# Patient Record
Sex: MALE | Race: WHITE | NOT HISPANIC OR LATINO | Employment: FULL TIME | URBAN - METROPOLITAN AREA
[De-identification: names, ages, dates, MRNs, and addresses within clinical notes are randomized per-mention and may not be internally consistent; named-entity substitution may affect disease eponyms.]

---

## 2024-09-09 ENCOUNTER — OFFICE VISIT (OUTPATIENT)
Dept: INTERNAL MEDICINE | Age: 34
End: 2024-09-09
Payer: COMMERCIAL

## 2024-09-09 VITALS
WEIGHT: 202.8 LBS | HEART RATE: 80 BPM | DIASTOLIC BLOOD PRESSURE: 82 MMHG | RESPIRATION RATE: 22 BRPM | BODY MASS INDEX: 32.59 KG/M2 | HEIGHT: 66 IN | SYSTOLIC BLOOD PRESSURE: 130 MMHG | OXYGEN SATURATION: 98 % | TEMPERATURE: 98.6 F

## 2024-09-09 DIAGNOSIS — E55.9 VITAMIN D DEFICIENCY: ICD-10-CM

## 2024-09-09 DIAGNOSIS — Z13.6 ENCOUNTER FOR SCREENING FOR CARDIOVASCULAR DISORDERS: ICD-10-CM

## 2024-09-09 DIAGNOSIS — J30.1 SEASONAL ALLERGIC RHINITIS DUE TO POLLEN: ICD-10-CM

## 2024-09-09 DIAGNOSIS — A68.9 FEVER, RECURRENT: Primary | ICD-10-CM

## 2024-09-09 DIAGNOSIS — Z11.59 NEED FOR HEPATITIS C SCREENING TEST: ICD-10-CM

## 2024-09-09 DIAGNOSIS — H65.01 RIGHT ACUTE SEROUS OTITIS MEDIA, RECURRENCE NOT SPECIFIED: ICD-10-CM

## 2024-09-09 LAB
25(OH)D3 SERPL-MCNC: 62 NG/ML (ref 30–100)
ALBUMIN SERPL-MCNC: 4.5 G/DL (ref 3.5–5.2)
ALBUMIN/GLOB SERPL: 1.2 G/DL
ALP SERPL-CCNC: 76 U/L (ref 39–117)
ALT SERPL W P-5'-P-CCNC: 30 U/L (ref 1–41)
ANION GAP SERPL CALCULATED.3IONS-SCNC: 11.6 MMOL/L (ref 5–15)
AST SERPL-CCNC: 21 U/L (ref 1–40)
BASOPHILS # BLD AUTO: 0.08 10*3/MM3 (ref 0–0.2)
BASOPHILS NFR BLD AUTO: 1.4 % (ref 0–1.5)
BILIRUB SERPL-MCNC: 0.6 MG/DL (ref 0–1.2)
BUN SERPL-MCNC: 9 MG/DL (ref 6–20)
BUN/CREAT SERPL: 10.8 (ref 7–25)
CALCIUM SPEC-SCNC: 10.5 MG/DL (ref 8.6–10.5)
CHLORIDE SERPL-SCNC: 102 MMOL/L (ref 98–107)
CHOLEST SERPL-MCNC: 162 MG/DL (ref 0–200)
CO2 SERPL-SCNC: 26.4 MMOL/L (ref 22–29)
CREAT SERPL-MCNC: 0.83 MG/DL (ref 0.76–1.27)
DEPRECATED RDW RBC AUTO: 39.1 FL (ref 37–54)
EGFRCR SERPLBLD CKD-EPI 2021: 119.3 ML/MIN/1.73
EOSINOPHIL # BLD AUTO: 0.09 10*3/MM3 (ref 0–0.4)
EOSINOPHIL NFR BLD AUTO: 1.6 % (ref 0.3–6.2)
ERYTHROCYTE [DISTWIDTH] IN BLOOD BY AUTOMATED COUNT: 12.2 % (ref 12.3–15.4)
ERYTHROCYTE [SEDIMENTATION RATE] IN BLOOD: 38 MM/HR (ref 0–15)
FERRITIN SERPL-MCNC: 581 NG/ML (ref 30–400)
FOLATE SERPL-MCNC: 15.6 NG/ML (ref 4.78–24.2)
GLOBULIN UR ELPH-MCNC: 3.7 GM/DL
GLUCOSE SERPL-MCNC: 94 MG/DL (ref 65–99)
HCT VFR BLD AUTO: 44.6 % (ref 37.5–51)
HCV AB SER QL: NORMAL
HDLC SERPL-MCNC: 29 MG/DL (ref 40–60)
HGB BLD-MCNC: 15 G/DL (ref 13–17.7)
HIV 1+2 AB+HIV1 P24 AG SERPL QL IA: NORMAL
IMM GRANULOCYTES # BLD AUTO: 0.07 10*3/MM3 (ref 0–0.05)
IMM GRANULOCYTES NFR BLD AUTO: 1.2 % (ref 0–0.5)
LDLC SERPL CALC-MCNC: 111 MG/DL (ref 0–100)
LDLC/HDLC SERPL: 3.77 {RATIO}
LYMPHOCYTES # BLD AUTO: 2.06 10*3/MM3 (ref 0.7–3.1)
LYMPHOCYTES NFR BLD AUTO: 36.3 % (ref 19.6–45.3)
MAGNESIUM SERPL-MCNC: 2.3 MG/DL (ref 1.6–2.6)
MCH RBC QN AUTO: 29.9 PG (ref 26.6–33)
MCHC RBC AUTO-ENTMCNC: 33.6 G/DL (ref 31.5–35.7)
MCV RBC AUTO: 88.8 FL (ref 79–97)
MONOCYTES # BLD AUTO: 0.43 10*3/MM3 (ref 0.1–0.9)
MONOCYTES NFR BLD AUTO: 7.6 % (ref 5–12)
NEUTROPHILS NFR BLD AUTO: 2.95 10*3/MM3 (ref 1.7–7)
NEUTROPHILS NFR BLD AUTO: 51.9 % (ref 42.7–76)
NRBC BLD AUTO-RTO: 0 /100 WBC (ref 0–0.2)
PLATELET # BLD AUTO: 356 10*3/MM3 (ref 140–450)
PMV BLD AUTO: 10.1 FL (ref 6–12)
POTASSIUM SERPL-SCNC: 4.6 MMOL/L (ref 3.5–5.2)
PROT SERPL-MCNC: 8.2 G/DL (ref 6–8.5)
RBC # BLD AUTO: 5.02 10*6/MM3 (ref 4.14–5.8)
SODIUM SERPL-SCNC: 140 MMOL/L (ref 136–145)
T3FREE SERPL-MCNC: 3.7 PG/ML (ref 2–4.4)
T4 FREE SERPL-MCNC: 1.25 NG/DL (ref 0.92–1.68)
TRIGL SERPL-MCNC: 119 MG/DL (ref 0–150)
TSH SERPL DL<=0.05 MIU/L-ACNC: 0.61 UIU/ML (ref 0.27–4.2)
VIT B12 BLD-MCNC: 683 PG/ML (ref 211–946)
VLDLC SERPL-MCNC: 22 MG/DL (ref 5–40)
WBC NRBC COR # BLD AUTO: 5.68 10*3/MM3 (ref 3.4–10.8)

## 2024-09-09 PROCEDURE — 86665 EPSTEIN-BARR CAPSID VCA: CPT | Performed by: INTERNAL MEDICINE

## 2024-09-09 PROCEDURE — 82306 VITAMIN D 25 HYDROXY: CPT | Performed by: INTERNAL MEDICINE

## 2024-09-09 PROCEDURE — 99213 OFFICE O/P EST LOW 20 MIN: CPT | Performed by: INTERNAL MEDICINE

## 2024-09-09 PROCEDURE — 80061 LIPID PANEL: CPT | Performed by: INTERNAL MEDICINE

## 2024-09-09 PROCEDURE — 84439 ASSAY OF FREE THYROXINE: CPT | Performed by: INTERNAL MEDICINE

## 2024-09-09 PROCEDURE — 85652 RBC SED RATE AUTOMATED: CPT | Performed by: INTERNAL MEDICINE

## 2024-09-09 PROCEDURE — G0432 EIA HIV-1/HIV-2 SCREEN: HCPCS | Performed by: INTERNAL MEDICINE

## 2024-09-09 PROCEDURE — 83735 ASSAY OF MAGNESIUM: CPT | Performed by: INTERNAL MEDICINE

## 2024-09-09 PROCEDURE — 36415 COLL VENOUS BLD VENIPUNCTURE: CPT | Performed by: INTERNAL MEDICINE

## 2024-09-09 PROCEDURE — 84443 ASSAY THYROID STIM HORMONE: CPT | Performed by: INTERNAL MEDICINE

## 2024-09-09 PROCEDURE — 82746 ASSAY OF FOLIC ACID SERUM: CPT | Performed by: INTERNAL MEDICINE

## 2024-09-09 PROCEDURE — 86803 HEPATITIS C AB TEST: CPT | Performed by: INTERNAL MEDICINE

## 2024-09-09 PROCEDURE — 82728 ASSAY OF FERRITIN: CPT | Performed by: INTERNAL MEDICINE

## 2024-09-09 PROCEDURE — 84481 FREE ASSAY (FT-3): CPT | Performed by: INTERNAL MEDICINE

## 2024-09-09 PROCEDURE — 85025 COMPLETE CBC W/AUTO DIFF WBC: CPT | Performed by: INTERNAL MEDICINE

## 2024-09-09 PROCEDURE — 82607 VITAMIN B-12: CPT | Performed by: INTERNAL MEDICINE

## 2024-09-09 PROCEDURE — 80053 COMPREHEN METABOLIC PANEL: CPT | Performed by: INTERNAL MEDICINE

## 2024-09-09 RX ORDER — MONTELUKAST SODIUM 10 MG/1
10 TABLET ORAL NIGHTLY
Qty: 30 TABLET | Refills: 2 | Status: SHIPPED | OUTPATIENT
Start: 2024-09-09

## 2024-09-09 RX ORDER — CETIRIZINE HYDROCHLORIDE 10 MG/1
10 TABLET ORAL DAILY
Qty: 30 TABLET | Refills: 2 | Status: SHIPPED | OUTPATIENT
Start: 2024-09-09

## 2024-09-09 RX ORDER — GUAIFENESIN 600 MG/1
1200 TABLET, EXTENDED RELEASE ORAL 2 TIMES DAILY
Qty: 30 TABLET | Refills: 0 | Status: SHIPPED | OUTPATIENT
Start: 2024-09-09

## 2024-09-09 RX ORDER — AZITHROMYCIN 250 MG/1
TABLET, FILM COATED ORAL
Qty: 6 TABLET | Refills: 0 | Status: SHIPPED | OUTPATIENT
Start: 2024-09-09

## 2024-09-09 NOTE — PROGRESS NOTES
CHIEF COMPLAINT  Brian Goodman presents to [facility] to Fever (States he has had a fever that has been persistent for 11 days. States he is also having neck stiffness, has been tested for covid and flu a few days ago and he was negative. The urgent care started him on antibiotics. Went to [facility] and they ruled out meningitis. ) and Establish Care (33 year old male here today to establish care with a new provider. )     HPI  33-year-old patient here to establish care.  Main concern is states he has had a fever for the last 11 days-PCP was >10 yrs ago Cheri      He was seen at [facility] ER 9/6/2024 -had a chest x-ray which was negative for any acute disease-no neck pain at that time negative for strep/flu a flu B- diagnosis was viral illness and low suspicion for any meningitis as he was 8 days out at that time from his symptoms-temp at that time was 99  Patient initially seen August 30, 2024 at urgent clinic-and had 1 week of upper respiratory infection symptoms with temp up to 102 at that time-temp was 99-diagnosis pharyngitis and was given Keflex 5 mg twice a day for 10 days  -He was negative for COVID flu AB and strep at that time  S/p amoxicillin in 8/2024 for ear infection    Now with NO neck stiffness, temp 101 last night and this am 100.7- took ibuprofen last night-200 mg-2 tabs- and tylenol 500 mg 2x/d past 9-10 days  Records reviewed, meds reviewed in detail, labs reviewed with patient.  Plan of care is as follows below.    [id number]-[id number]-BL- no meds usually  ROS-fever, congestion, cough, PND, green nasal d/c, no rash, no arthralgias    SH-works at [employer]-from home, has 2 and 4 yo-goes to day care/-not sick at present-wife is a schoolteacher- Dany curtis, no cigs , ETOH or drugs      Past History:  Allergies: Patient has no known allergies.   Medical History: has a past medical history of Heart abnormality.   Surgical History: has  "a past surgical history that includes [place] tooth extraction.   Family History: family history is not on file.   Social History: reports that he has never smoked. He has never used smokeless tobacco. He reports that he does not currently use alcohol. He reports that he does not use drugs.  Social History     Social History Narrative    Not on file         Current Outpatient Medications:     azithromycin (Zithromax Z-Amado) 250 MG tablet, Take 2 tablets by mouth on day 1, then 1 tablet daily on days 2-5, Disp: 6 tablet, Rfl: 0    cephalexin (KEFLEX) 500 MG capsule, Take 1 capsule by mouth 2 (Two) Times a Day for 10 days. (Patient not taking: Reported on 9/9/2024), Disp: 20 capsule, Rfl: 0    cetirizine (zyrTEC) 10 MG tablet, Take 1 tablet by mouth Daily., Disp: 30 tablet, Rfl: 2    guaiFENesin (Mucinex) 600 MG 12 hr tablet, Take 2 tablets by mouth 2 (Two) Times a Day., Disp: 30 tablet, Rfl: 0    montelukast (Singulair) 10 MG tablet, Take 1 tablet by mouth Every Night., Disp: 30 tablet, Rfl: 2     OBJECTIVE  Vital Signs  Vitals:    09/09/24 0928 09/09/24 0956 09/09/24 1005   BP: 148/92 130/82    BP Location: Left arm Left arm    Patient Position: Sitting Sitting    Cuff Size:  Adult    Pulse: 102  80   Resp: 22     Temp: 98.6 °F (37 °C)     TempSrc: Temporal     SpO2: 98%     Weight: 92 kg (202 lb 12.8 oz)     Height: 168.9 cm (66.5\")        Body mass index is 32.25 kg/m².      Physical Exam  Vitals and nursing note reviewed.   Constitutional:       Appearance: Normal appearance.   HENT:      Head: Normocephalic and atraumatic.      Left Ear: Tympanic membrane normal.      Ears:      Comments: Dull TM -     Nose: Nose normal. Rhinorrhea present.      Mouth/Throat:      Pharynx: No oropharyngeal exudate or posterior oropharyngeal erythema.   Eyes:      Extraocular Movements: Extraocular movements intact.      Pupils: Pupils are equal, round, and reactive to light.   Cardiovascular:      Rate and Rhythm: Normal rate and " "regular rhythm.   Pulmonary:      Effort: Pulmonary effort is normal.      Breath sounds: Normal breath sounds.   Abdominal:      General: Abdomen is flat.      Palpations: Abdomen is soft.   Musculoskeletal:      Cervical back: Normal range of motion and neck supple.   Skin:     General: Skin is warm and dry.   Neurological:      General: No focal deficit present.      Mental Status: He is alert and oriented to person, place, and time.   Psychiatric:         Mood and Affect: Mood normal.         Behavior: Behavior normal.         RESULTS REVIEW  No results found for: \"PROBNP\", \"BNP\"          No results found for: \"TSH\"   No results found for: \"FREET4\"         No Images in the past 120 days found..              ASSESSMENT & PLAN  Diagnoses and all orders for this visit:    1. Fever, recurrent (Primary)  Comments:  For past 17 days status post Keflex-last dose today and prior to that amox x2 wks negative chest x-ray 3 days ago do labs today-for FUO even though < 3 weeks  Orders:  -     Comprehensive Metabolic Panel; Future  -     Lipid Panel; Future  -     TSH+Free T4; Future  -     T3, Free; Future  -     Vitamin B12; Future  -     Folate; Future  -     Magnesium; Future  -     Vitamin D,25-Hydroxy; Future  -     CBC & Differential; Future  -     MicroAlbumin, Urine, Random - Urine, Clean Catch; Future  -     Hepatitis C Antibody; Future  -     azithromycin (Zithromax Z-Amado) 250 MG tablet; Take 2 tablets by mouth on day 1, then 1 tablet daily on days 2-5  Dispense: 6 tablet; Refill: 0  -     guaiFENesin (Mucinex) 600 MG 12 hr tablet; Take 2 tablets by mouth 2 (Two) Times a Day.  Dispense: 30 tablet; Refill: 0  -     Urinalysis With Culture If Indicated -; Future  -     HIV-1 / O / 2 Ag / Antibody; Future  -     Sedimentation Rate; Future  -     EBVCA(IgG / M); Future  -     Ferritin; Future    2. Right acute serous otitis media, recurrence not specified  Comments:  Mucinex twice a day as needed for " congestion  Orders:  -     azithromycin (Zithromax Z-Amado) 250 MG tablet; Take 2 tablets by mouth on day 1, then 1 tablet daily on days 2-5  Dispense: 6 tablet; Refill: 0  -     guaiFENesin (Mucinex) 600 MG 12 hr tablet; Take 2 tablets by mouth 2 (Two) Times a Day.  Dispense: 30 tablet; Refill: 0  -     montelukast (Singulair) 10 MG tablet; Take 1 tablet by mouth Every Night.  Dispense: 30 tablet; Refill: 2    3. Seasonal allergic rhinitis due to pollen  Comments:  Start Singulair 10 mg daily Zyrtec 10 mg daily and Mucinex twice a day as needed  Orders:  -     Comprehensive Metabolic Panel; Future  -     Lipid Panel; Future  -     TSH+Free T4; Future  -     T3, Free; Future  -     Vitamin B12; Future  -     Folate; Future  -     Magnesium; Future  -     Vitamin D,25-Hydroxy; Future  -     CBC & Differential; Future  -     MicroAlbumin, Urine, Random - Urine, Clean Catch; Future  -     Hepatitis C Antibody; Future  -     montelukast (Singulair) 10 MG tablet; Take 1 tablet by mouth Every Night.  Dispense: 30 tablet; Refill: 2  -     cetirizine (zyrTEC) 10 MG tablet; Take 1 tablet by mouth Daily.  Dispense: 30 tablet; Refill: 2  -     Urinalysis With Culture If Indicated -; Future  -     HIV-1 / O / 2 Ag / Antibody; Future  -     Sedimentation Rate; Future  -     EBVCA(IgG / M); Future    4. Need for hepatitis C screening test  -     Comprehensive Metabolic Panel; Future  -     Lipid Panel; Future  -     TSH+Free T4; Future  -     T3, Free; Future  -     Vitamin B12; Future  -     Folate; Future  -     Magnesium; Future  -     Vitamin D,25-Hydroxy; Future  -     CBC & Differential; Future  -     MicroAlbumin, Urine, Random - Urine, Clean Catch; Future  -     Hepatitis C Antibody; Future    5. Encounter for screening for cardiovascular disorders  -     Comprehensive Metabolic Panel; Future  -     Lipid Panel; Future  -     TSH+Free T4; Future  -     T3, Free; Future  -     Vitamin B12; Future  -     Folate; Future  -      Magnesium; Future  -     Vitamin D,25-Hydroxy; Future  -     CBC & Differential; Future  -     MicroAlbumin, Urine, Random - Urine, Clean Catch; Future  -     Hepatitis C Antibody; Future    6. Vitamin D deficiency  -     Comprehensive Metabolic Panel; Future  -     Lipid Panel; Future  -     TSH+Free T4; Future  -     T3, Free; Future  -     Vitamin B12; Future  -     Folate; Future  -     Magnesium; Future  -     Vitamin D,25-Hydroxy; Future  -     CBC & Differential; Future  -     MicroAlbumin, Urine, Random - Urine, Clean Catch; Future  -     Hepatitis C Antibody; Future         BMI is >= 30 and <35. (Class 1 Obesity). The following options were offered after discussion;: weight loss educational material (shared in after visit summary), exercise counseling/recommendations, and nutrition counseling/recommendations     Plan-9/9/2024  Patient Instructions   Patient with fever of 17 days etiology unclear we will order lab work to look for fever unknown origin even though technically > 3 weeks is definition of FUO  Check EMILY HIV sed rate EBV urinalysis along with routine labs today  Treat with Z-Amado for broader coverage and use Mucinex twice a day as needed for right serous otitis  Try montelukast/Singulair 10 mg once daily for postnasal drainage  Try Zyrtec 10 mg once daily  Follow-up in 10 to 14 days for reevaluation      Addendum 9/10/2024 WBC normal increased immature granulocytes 1 and 1% and sed rate elevated patient was given a Z-Amado results consistent with possible bacterial infection being treated now with more broader antibiotic will continue to monitor rest of labs unremarkable  FOLLOW UP  Return in about 10 days (around 9/19/2024) for Recheck, Annual physical.  Review labs    Patient was given instructions and counseling regarding his condition or for health maintenance advice. Please see specific information pulled into the AVS if appropriate.

## 2024-09-09 NOTE — PATIENT INSTRUCTIONS
Patient with fever of 17 days etiology unclear we will order lab work to look for fever unknown origin even though technically > 3 weeks is definition of FUO  Check EMILY HIV sed rate EBV urinalysis along with routine labs today  Treat with Z-Amado for broader coverage and use Mucinex twice a day as needed for right serous otitis  Try montelukast/Singulair 10 mg once daily for postnasal drainage  Try Zyrtec 10 mg once daily

## 2024-09-10 LAB
EBV VCA IGG SER IA-ACNC: 184 U/ML (ref 0–17.9)
EBV VCA IGM SER IA-ACNC: <36 U/ML (ref 0–35.9)

## 2024-09-23 ENCOUNTER — OFFICE VISIT (OUTPATIENT)
Dept: INTERNAL MEDICINE | Age: 34
End: 2024-09-23
Payer: COMMERCIAL

## 2024-09-23 VITALS
WEIGHT: 202 LBS | SYSTOLIC BLOOD PRESSURE: 132 MMHG | HEIGHT: 67 IN | DIASTOLIC BLOOD PRESSURE: 90 MMHG | TEMPERATURE: 97.4 F | OXYGEN SATURATION: 98 % | HEART RATE: 102 BPM | BODY MASS INDEX: 31.71 KG/M2

## 2024-09-23 DIAGNOSIS — Z23 NEED FOR VACCINATION: Primary | ICD-10-CM

## 2024-09-23 DIAGNOSIS — F32.9 DEPRESSION, REACTIVE: ICD-10-CM

## 2024-09-23 DIAGNOSIS — E55.9 VITAMIN D DEFICIENCY: ICD-10-CM

## 2024-09-23 DIAGNOSIS — Z00.00 ROUTINE HISTORY AND PHYSICAL EXAMINATION OF ADULT: ICD-10-CM

## 2024-09-23 DIAGNOSIS — R68.82 DECREASED LIBIDO: ICD-10-CM

## 2024-09-23 DIAGNOSIS — J30.1 SEASONAL ALLERGIC RHINITIS DUE TO POLLEN: ICD-10-CM

## 2024-09-23 DIAGNOSIS — I51.7 LVH (LEFT VENTRICULAR HYPERTROPHY): ICD-10-CM

## 2024-09-23 DIAGNOSIS — I10 PRIMARY HYPERTENSION: ICD-10-CM

## 2024-09-23 LAB
ALBUMIN SERPL-MCNC: 4.3 G/DL (ref 3.5–5.2)
ALBUMIN/GLOB SERPL: 1.2 G/DL
ALP SERPL-CCNC: 80 U/L (ref 39–117)
ALT SERPL W P-5'-P-CCNC: 24 U/L (ref 1–41)
ANION GAP SERPL CALCULATED.3IONS-SCNC: 11.5 MMOL/L (ref 5–15)
AST SERPL-CCNC: 21 U/L (ref 1–40)
BILIRUB SERPL-MCNC: 1.2 MG/DL (ref 0–1.2)
BUN SERPL-MCNC: 11 MG/DL (ref 6–20)
BUN/CREAT SERPL: 12.8 (ref 7–25)
CALCIUM SPEC-SCNC: 9.9 MG/DL (ref 8.6–10.5)
CHLORIDE SERPL-SCNC: 102 MMOL/L (ref 98–107)
CO2 SERPL-SCNC: 25.5 MMOL/L (ref 22–29)
CREAT SERPL-MCNC: 0.86 MG/DL (ref 0.76–1.27)
EGFRCR SERPLBLD CKD-EPI 2021: 118 ML/MIN/1.73
GLOBULIN UR ELPH-MCNC: 3.6 GM/DL
GLUCOSE SERPL-MCNC: 95 MG/DL (ref 65–99)
POTASSIUM SERPL-SCNC: 4.2 MMOL/L (ref 3.5–5.2)
PROT SERPL-MCNC: 7.9 G/DL (ref 6–8.5)
SODIUM SERPL-SCNC: 139 MMOL/L (ref 136–145)

## 2024-09-23 PROCEDURE — 90471 IMMUNIZATION ADMIN: CPT | Performed by: INTERNAL MEDICINE

## 2024-09-23 PROCEDURE — 90656 IIV3 VACC NO PRSV 0.5 ML IM: CPT | Performed by: INTERNAL MEDICINE

## 2024-09-23 PROCEDURE — 99214 OFFICE O/P EST MOD 30 MIN: CPT | Performed by: INTERNAL MEDICINE

## 2024-09-23 PROCEDURE — 84403 ASSAY OF TOTAL TESTOSTERONE: CPT | Performed by: INTERNAL MEDICINE

## 2024-09-23 PROCEDURE — 99395 PREV VISIT EST AGE 18-39: CPT | Performed by: INTERNAL MEDICINE

## 2024-09-23 PROCEDURE — 80053 COMPREHEN METABOLIC PANEL: CPT | Performed by: INTERNAL MEDICINE

## 2024-09-23 PROCEDURE — 36415 COLL VENOUS BLD VENIPUNCTURE: CPT | Performed by: INTERNAL MEDICINE

## 2024-09-23 PROCEDURE — 84402 ASSAY OF FREE TESTOSTERONE: CPT | Performed by: INTERNAL MEDICINE

## 2024-09-23 RX ORDER — LISINOPRIL 2.5 MG/1
2.5 TABLET ORAL DAILY
Qty: 90 TABLET | Refills: 1 | Status: SHIPPED | OUTPATIENT
Start: 2024-09-23

## 2024-09-23 RX ORDER — ESCITALOPRAM OXALATE 10 MG/1
10 TABLET ORAL DAILY
Qty: 30 TABLET | Refills: 1 | Status: SHIPPED | OUTPATIENT
Start: 2024-09-23

## 2024-09-29 LAB
TESTOST FREE SERPL-MCNC: 8.4 PG/ML (ref 8.7–25.1)
TESTOST SERPL-MCNC: 307.4 NG/DL (ref 264–916)

## 2024-10-07 ENCOUNTER — HOSPITAL ENCOUNTER (OUTPATIENT)
Dept: CARDIOLOGY | Facility: HOSPITAL | Age: 34
Discharge: HOME | End: 2024-10-07
Admitting: INTERNAL MEDICINE
Payer: COMMERCIAL

## 2024-10-07 DIAGNOSIS — I51.7 LVH (LEFT VENTRICULAR HYPERTROPHY): ICD-10-CM

## 2024-10-07 DIAGNOSIS — Z00.00 ROUTINE HISTORY AND PHYSICAL EXAMINATION OF ADULT: ICD-10-CM

## 2024-10-07 DIAGNOSIS — E55.9 VITAMIN D DEFICIENCY: ICD-10-CM

## 2024-10-07 DIAGNOSIS — I10 PRIMARY HYPERTENSION: ICD-10-CM

## 2024-10-07 DIAGNOSIS — J30.1 SEASONAL ALLERGIC RHINITIS DUE TO POLLEN: ICD-10-CM

## 2024-10-07 PROCEDURE — 93306 TTE W/DOPPLER COMPLETE: CPT

## 2024-10-09 LAB
ASCENDING AORTA: 3.2 CM
BH CV ECHO MEAS - ACS: 1.85 CM
BH CV ECHO MEAS - AO MAX PG: 7.2 MMHG
BH CV ECHO MEAS - AO MEAN PG: 4.3 MMHG
BH CV ECHO MEAS - AO ROOT DIAM: 3.2 CM
BH CV ECHO MEAS - AO V2 MAX: 134 CM/SEC
BH CV ECHO MEAS - AO V2 VTI: 26.2 CM
BH CV ECHO MEAS - EDV(MOD-SP2): 73.9 ML
BH CV ECHO MEAS - EDV(MOD-SP4): 66.9 ML
BH CV ECHO MEAS - EF(MOD-BP): 57.2 %
BH CV ECHO MEAS - EF(MOD-SP2): 56 %
BH CV ECHO MEAS - EF(MOD-SP4): 58.5 %
BH CV ECHO MEAS - ESV(MOD-SP2): 32.5 ML
BH CV ECHO MEAS - ESV(MOD-SP4): 27.8 ML
BH CV ECHO MEAS - IVS/LVPW: 0.55 CM
BH CV ECHO MEAS - IVSD: 0.6 CM
BH CV ECHO MEAS - LA DIMENSION: 3.9 CM
BH CV ECHO MEAS - LAT PEAK E' VEL: 20.4 CM/SEC
BH CV ECHO MEAS - LV MAX PG: 2.6 MMHG
BH CV ECHO MEAS - LV MEAN PG: 1.5 MMHG
BH CV ECHO MEAS - LV V1 MAX: 80 CM/SEC
BH CV ECHO MEAS - LV V1 VTI: 17.5 CM
BH CV ECHO MEAS - LVIDD: 5.3 CM
BH CV ECHO MEAS - LVIDS: 3.7 CM
BH CV ECHO MEAS - LVOT DIAM: 2 CM
BH CV ECHO MEAS - LVPWD: 1.1 CM
BH CV ECHO MEAS - MED PEAK E' VEL: 12.9 CM/SEC
BH CV ECHO MEAS - MV A MAX VEL: 67.7 CM/SEC
BH CV ECHO MEAS - MV DEC TIME: 197 SEC
BH CV ECHO MEAS - MV E MAX VEL: 86.7 CM/SEC
BH CV ECHO MEAS - MV E/A: 1.28
BH CV ECHO MEAS - TAPSE (>1.6): 2.16 CM
BH CV ECHO MEASUREMENTS AVERAGE E/E' RATIO: 5.21
IVRT: 72 MS

## 2024-10-29 ENCOUNTER — OFFICE VISIT (OUTPATIENT)
Dept: INTERNAL MEDICINE | Age: 34
End: 2024-10-29
Payer: COMMERCIAL

## 2024-10-29 VITALS
SYSTOLIC BLOOD PRESSURE: 120 MMHG | WEIGHT: 207.4 LBS | RESPIRATION RATE: 18 BRPM | HEIGHT: 66 IN | BODY MASS INDEX: 33.33 KG/M2 | OXYGEN SATURATION: 98 % | TEMPERATURE: 97.3 F | HEART RATE: 63 BPM | DIASTOLIC BLOOD PRESSURE: 82 MMHG

## 2024-10-29 DIAGNOSIS — E55.9 VITAMIN D DEFICIENCY: ICD-10-CM

## 2024-10-29 DIAGNOSIS — R68.82 DECREASED LIBIDO: ICD-10-CM

## 2024-10-29 DIAGNOSIS — I10 PRIMARY HYPERTENSION: Primary | ICD-10-CM

## 2024-10-29 DIAGNOSIS — H65.01 RIGHT ACUTE SEROUS OTITIS MEDIA, RECURRENCE NOT SPECIFIED: ICD-10-CM

## 2024-10-29 DIAGNOSIS — N52.9 ERECTILE DISORDER: ICD-10-CM

## 2024-10-29 DIAGNOSIS — J30.1 SEASONAL ALLERGIC RHINITIS DUE TO POLLEN: ICD-10-CM

## 2024-10-29 DIAGNOSIS — F32.9 DEPRESSION, REACTIVE: ICD-10-CM

## 2024-10-29 PROBLEM — I51.7 LVH (LEFT VENTRICULAR HYPERTROPHY): Status: RESOLVED | Noted: 2024-09-23 | Resolved: 2024-10-29

## 2024-10-29 PROCEDURE — 99214 OFFICE O/P EST MOD 30 MIN: CPT | Performed by: INTERNAL MEDICINE

## 2024-10-29 RX ORDER — BUPROPION HYDROCHLORIDE 150 MG/1
TABLET ORAL
Qty: 60 TABLET | Refills: 2 | Status: SHIPPED | OUTPATIENT
Start: 2024-10-29 | End: 2024-10-29

## 2024-10-29 RX ORDER — MONTELUKAST SODIUM 10 MG/1
10 TABLET ORAL NIGHTLY
Qty: 90 TABLET | Refills: 1 | Status: SHIPPED | OUTPATIENT
Start: 2024-10-29

## 2024-10-29 RX ORDER — CETIRIZINE HYDROCHLORIDE 10 MG/1
10 TABLET ORAL DAILY
Qty: 90 TABLET | Refills: 1 | Status: SHIPPED | OUTPATIENT
Start: 2024-10-29

## 2024-10-29 RX ORDER — ESCITALOPRAM OXALATE 20 MG/1
20 TABLET ORAL DAILY
Qty: 30 TABLET | Refills: 5 | Status: SHIPPED | OUTPATIENT
Start: 2024-10-29 | End: 2024-10-29

## 2024-10-29 RX ORDER — BUPROPION HYDROCHLORIDE 150 MG/1
TABLET ORAL
Qty: 60 TABLET | Refills: 2 | Status: SHIPPED | OUTPATIENT
Start: 2024-10-29

## 2024-10-29 NOTE — PATIENT INSTRUCTIONS
Goal weight 170 lbs per pt  Change to wellbutrin  mg one q am x 1 week then 2 po q am  Walk 30 min daily  Get involved with exercise routine-tennis/join gym     Refer to Dr. Prakash urology for further evaluation of ED

## 2024-10-29 NOTE — ASSESSMENT & PLAN NOTE
HTN-fair control-BP log reviewed and blood pressure at home ranging from 117-129/76-81 lowest was 109/72    plan-discussed hypertension and complications   goal is blood pressure less than 130/80

## 2024-10-29 NOTE — PROGRESS NOTES
"CHIEF COMPLAINT  Brian Goodman presents to [facility] for follow-up of Hypertension (34 year old male here today for a 4 week f/u on his bp).    HPI  34-year-old patient here for follow-up of blood pressure    Records reviewed, meds reviewed in detail, labs reviewed with patient.  Plan of care is as follows below.    Not walking regularly  Echo 10/2024--no LVH- echo 2009- [facility]- EF50-55%borderline golbal hypokinesis LV  PMFSH-Reviewed  ROS-no headaches, still depressed, decreased motivation      Current Outpatient Medications:     buPROPion XL (Wellbutrin XL) 150 MG 24 hr tablet, One tab po q am for one week then 2 tabs po q am, Disp: 60 tablet, Rfl: 2    cetirizine (zyrTEC) 10 MG tablet, Take 1 tablet by mouth Daily., Disp: 90 tablet, Rfl: 1    lisinopril (Zestril) 2.5 MG tablet, Take 1 tablet by mouth Daily., Disp: 90 tablet, Rfl: 1    montelukast (Singulair) 10 MG tablet, Take 1 tablet by mouth Every Night., Disp: 90 tablet, Rfl: 1   PFSH reviewed.      OBJECTIVE  Vital Signs  Vitals:    10/29/24 0801 10/29/24 0849   BP: 138/84 120/82   BP Location: Left arm Left arm   Patient Position: Sitting Sitting   Cuff Size:  Large Adult   Pulse: 63    Resp: 18    Temp: 97.3 °F (36.3 °C)    TempSrc: Temporal    SpO2: 98%    Weight: 94.1 kg (207 lb 6.4 oz)    Height: 168.9 cm (66.5\")       Body mass index is 32.98 kg/m².    Physical Exam  Vitals and nursing note reviewed.   Constitutional:       Appearance: Normal appearance.   HENT:      Head: Normocephalic and atraumatic.      Nose: Nose normal.   Eyes:      Extraocular Movements: Extraocular movements intact.      Pupils: Pupils are equal, round, and reactive to light.   Cardiovascular:      Rate and Rhythm: Normal rate and regular rhythm.   Pulmonary:      Effort: Pulmonary effort is normal.      Breath sounds: Normal breath sounds.   Abdominal:      General: Abdomen is flat.      Palpations: Abdomen is soft.   Musculoskeletal:      " "Cervical back: Normal range of motion and neck supple.   Skin:     General: Skin is warm and dry.   Neurological:      General: No focal deficit present.      Mental Status: He is alert and oriented to person, place, and time.   Psychiatric:         Mood and Affect: Mood normal.         Behavior: Behavior normal.          RESULTS REVIEW  No results found for: \"PROBNP\", \"BNP\"  CMP          9/9/2024    11:05 9/23/2024    10:26   CMP   Glucose 94  95    BUN 9  11    Creatinine 0.83  0.86    EGFR 119.3  118.0    Sodium 140  139    Potassium 4.6  4.2    Chloride 102  102    Calcium 10.5  9.9    Total Protein 8.2  7.9    Albumin 4.5  4.3    Globulin 3.7  3.6    Total Bilirubin 0.6  1.2    Alkaline Phosphatase 76  80    AST (SGOT) 21  21    ALT (SGPT) 30  24    Albumin/Globulin Ratio 1.2  1.2    BUN/Creatinine Ratio 10.8  12.8    Anion Gap 11.6  11.5      CBC w/diff          9/9/2024    11:05   CBC w/Diff   WBC 5.68    RBC 5.02    Hemoglobin 15.0    Hematocrit 44.6    MCV 88.8    MCH 29.9    MCHC 33.6    RDW 12.2    Platelets 356    Neutrophil Rel % 51.9    Immature Granulocyte Rel % 1.2    Lymphocyte Rel % 36.3    Monocyte Rel % 7.6    Eosinophil Rel % 1.6    Basophil Rel % 1.4       Lipid Panel          9/9/2024    11:05   Lipid Panel   Total Cholesterol 162    Triglycerides 119    HDL Cholesterol 29    VLDL Cholesterol 22    LDL Cholesterol  111    LDL/HDL Ratio 3.77       Lab Results   Component Value Date    TSH 0.612 09/09/2024      Lab Results   Component Value Date    FREET4 1.25 09/09/2024         Lab Results   Component Value Date    APPEKBLZ63 683 09/09/2024    SGKA54QB 62.0 09/09/2024    MG 2.3 09/09/2024        No Images in the past 120 days found..             ASSESSMENT & PLAN  Diagnoses and all orders for this visit:    1. Primary hypertension (Primary)  Assessment & Plan:  HTN-fair control-BP log reviewed and blood pressure at home ranging from 117-129/76-81 lowest was 109/72    plan-discussed hypertension " and complications   goal is blood pressure less than 130/80    Orders:  -     Comprehensive Metabolic Panel; Future  -     Lipid Panel; Future  -     TSH+Free T4; Future  -     T3, Free; Future  -     Vitamin B12; Future  -     Folate; Future  -     Magnesium; Future  -     Vitamin D,25-Hydroxy; Future  -     CBC & Differential; Future  -     MicroAlbumin, Urine, Random - Urine, Clean Catch; Future    2. Depression, reactive  -     Discontinue: escitalopram (Lexapro) 20 MG tablet; Take 1 tablet by mouth Daily.  Dispense: 30 tablet; Refill: 5  -     Ambulatory Referral to Urology  -     Discontinue: buPROPion XL (Wellbutrin XL) 150 MG 24 hr tablet; One tab po q am for one week then 2 tabs po q am  Dispense: 60 tablet; Refill: 2  -     buPROPion XL (Wellbutrin XL) 150 MG 24 hr tablet; One tab po q am for one week then 2 tabs po q am  Dispense: 60 tablet; Refill: 2  -     Comprehensive Metabolic Panel; Future  -     Lipid Panel; Future  -     TSH+Free T4; Future  -     T3, Free; Future  -     Vitamin B12; Future  -     Folate; Future  -     Magnesium; Future  -     Vitamin D,25-Hydroxy; Future  -     CBC & Differential; Future  -     MicroAlbumin, Urine, Random - Urine, Clean Catch; Future    3. Decreased libido  -     Ambulatory Referral to Urology  -     Comprehensive Metabolic Panel; Future  -     Lipid Panel; Future  -     TSH+Free T4; Future  -     T3, Free; Future  -     Vitamin B12; Future  -     Folate; Future  -     Magnesium; Future  -     Vitamin D,25-Hydroxy; Future  -     CBC & Differential; Future  -     MicroAlbumin, Urine, Random - Urine, Clean Catch; Future    4. Erectile disorder  -     Ambulatory Referral to Urology  -     Comprehensive Metabolic Panel; Future  -     Lipid Panel; Future  -     TSH+Free T4; Future  -     T3, Free; Future  -     Vitamin B12; Future  -     Folate; Future  -     Magnesium; Future  -     Vitamin D,25-Hydroxy; Future  -     CBC & Differential; Future  -     MicroAlbumin,  Urine, Random - Urine, Clean Catch; Future    5. Seasonal allergic rhinitis due to pollen  Comments:  Start Singulair 10 mg daily Zyrtec 10 mg daily and Mucinex twice a day as needed  Orders:  -     montelukast (Singulair) 10 MG tablet; Take 1 tablet by mouth Every Night.  Dispense: 90 tablet; Refill: 1  -     cetirizine (zyrTEC) 10 MG tablet; Take 1 tablet by mouth Daily.  Dispense: 90 tablet; Refill: 1  -     Comprehensive Metabolic Panel; Future  -     Lipid Panel; Future  -     TSH+Free T4; Future  -     T3, Free; Future  -     Vitamin B12; Future  -     Folate; Future  -     Magnesium; Future  -     Vitamin D,25-Hydroxy; Future  -     CBC & Differential; Future  -     MicroAlbumin, Urine, Random - Urine, Clean Catch; Future    6. Right acute serous otitis media, recurrence not specified  Comments:  Mucinex twice a day as needed for congestion  Orders:  -     montelukast (Singulair) 10 MG tablet; Take 1 tablet by mouth Every Night.  Dispense: 90 tablet; Refill: 1  -     Comprehensive Metabolic Panel; Future  -     Lipid Panel; Future  -     TSH+Free T4; Future  -     T3, Free; Future  -     Vitamin B12; Future  -     Folate; Future  -     Magnesium; Future  -     Vitamin D,25-Hydroxy; Future  -     CBC & Differential; Future  -     MicroAlbumin, Urine, Random - Urine, Clean Catch; Future    7. Vitamin D deficiency  -     Vitamin D,25-Hydroxy; Future            Plan-10/30/2024    Patient Instructions   Goal weight 170 lbs per pt  Change to wellbutrin  mg one q am x 1 week then 2 po q am  Walk 30 min daily  Get involved with exercise routine-tennis/join gym     Refer to Dr. Prakash urology for further evaluation of ED     FOLLOW UP  Return in about 2 months (around 1/13/2025) for Recheck.    Patient was given instructions and counseling regarding his condition or for health maintenance advice. Please see specific information pulled into the AVS if appropriate.     Older Notes  9/23/2024 visit  pt here for  Physical and routine f/u and lab review     Records reviewed, meds reviewed in detail, labs reviewed with patient.  Plan of care is as follows below.  , HDL 29,         Palpitations -saw cardio-neg holter-echo as below-  HTN--echo -LVH, LAE enlarged-in past-   FH- Dad  of MI 39 yo, +DM in mom and dad    Instructions-2024  Lisinopril 2.5 mg once a day goal blood pressures less than 130/80  Start Lexapro 10 mg 1 daily  Declined behavioral health at this time will reevaluate  Check testosterone level today  Due to the echo to evaluate LVH and LAE that was seen in  echo  Check blood pressure twice a day 3-4 times a week and record  Walk 30 min daily  Follow-up in 4 weeks    -2024  Patient Instructions   Patient with fever of 17 days etiology unclear we will order lab work to look for fever unknown origin even though technically > 3 weeks is definition of FUO  Check EMILY HIV sed rate EBV urinalysis along with routine labs today  Treat with Z-Amado for broader coverage and use Mucinex twice a day as needed for right serous otitis  Try montelukast/Singulair 10 mg once daily for postnasal drainage  Try Zyrtec 10 mg once daily  Follow-up in 10 to 14 days for reevaluation     24 visit  He was seen at [facility] ER 2024 -had a chest x-ray which was negative for any acute disease-no neck pain at that time negative for strep/flu a flu B- diagnosis was viral illness and low suspicion for any meningitis as he was 8 days out at that time from his symptoms-temp at that time was 99  Patient initially seen 2024 at urgent clinic-and had 1 week of upper respiratory infection symptoms with temp up to 102 at that time-temp was 99-diagnosis pharyngitis and was given Keflex 5 mg twice a day for 10 days  -He was negative for COVID flu AB and strep at that time  S/p amoxicillin in 2024 for ear infection     Now with NO neck stiffness, temp 101 last night and this am 100.7- took  ibuprofen last night-200 mg-2 tabs- and tylenol 500 mg 2x/d past 9-10 days  Records reviewed, meds reviewed in detail, labs reviewed with patient.  Plan of care is as follows below.     DLGSO-Amfmalob-OE- no meds usually  ROS-fever, congestion, cough, PND, green nasal d/c, no rash, no arthralgias     SH-works at [employer]-from home, has 2 and 4 yo-goes to day care/-not sick at present-wife is a schoolteacher- [employer], no cigs , ETOH or drugs

## 2025-02-05 ENCOUNTER — LAB (OUTPATIENT)
Dept: INTERNAL MEDICINE | Age: 35
End: 2025-02-05
Payer: COMMERCIAL

## 2025-02-05 DIAGNOSIS — F32.9 DEPRESSION, REACTIVE: ICD-10-CM

## 2025-02-05 DIAGNOSIS — H65.01 RIGHT ACUTE SEROUS OTITIS MEDIA, RECURRENCE NOT SPECIFIED: ICD-10-CM

## 2025-02-05 DIAGNOSIS — J30.1 SEASONAL ALLERGIC RHINITIS DUE TO POLLEN: ICD-10-CM

## 2025-02-05 DIAGNOSIS — N52.9 ERECTILE DISORDER: ICD-10-CM

## 2025-02-05 DIAGNOSIS — R68.82 DECREASED LIBIDO: ICD-10-CM

## 2025-02-05 DIAGNOSIS — I10 PRIMARY HYPERTENSION: ICD-10-CM

## 2025-02-05 DIAGNOSIS — E55.9 VITAMIN D DEFICIENCY: ICD-10-CM

## 2025-02-05 LAB
25(OH)D3 SERPL-MCNC: 42.5 NG/ML (ref 30–100)
CHOLEST SERPL-MCNC: 185 MG/DL (ref 0–200)
FOLATE SERPL-MCNC: 16 NG/ML (ref 4.78–24.2)
HDLC SERPL-MCNC: 37 MG/DL (ref 40–60)
LDLC SERPL CALC-MCNC: 133 MG/DL (ref 0–100)
LDLC/HDLC SERPL: 3.57 {RATIO}
MAGNESIUM SERPL-MCNC: 2.3 MG/DL (ref 1.6–2.6)
T3FREE SERPL-MCNC: 3.68 PG/ML (ref 2–4.4)
T4 FREE SERPL-MCNC: 1.24 NG/DL (ref 0.92–1.68)
TRIGL SERPL-MCNC: 79 MG/DL (ref 0–150)
TSH SERPL DL<=0.05 MIU/L-ACNC: 1.34 UIU/ML (ref 0.27–4.2)
VIT B12 BLD-MCNC: 433 PG/ML (ref 211–946)
VLDLC SERPL-MCNC: 15 MG/DL (ref 5–40)

## 2025-02-05 PROCEDURE — 84481 FREE ASSAY (FT-3): CPT | Performed by: INTERNAL MEDICINE

## 2025-02-05 PROCEDURE — 84443 ASSAY THYROID STIM HORMONE: CPT | Performed by: INTERNAL MEDICINE

## 2025-02-05 PROCEDURE — 84439 ASSAY OF FREE THYROXINE: CPT | Performed by: INTERNAL MEDICINE

## 2025-02-05 PROCEDURE — 82746 ASSAY OF FOLIC ACID SERUM: CPT | Performed by: INTERNAL MEDICINE

## 2025-02-05 PROCEDURE — 80061 LIPID PANEL: CPT | Performed by: INTERNAL MEDICINE

## 2025-02-05 PROCEDURE — 82306 VITAMIN D 25 HYDROXY: CPT | Performed by: INTERNAL MEDICINE

## 2025-02-05 PROCEDURE — 36415 COLL VENOUS BLD VENIPUNCTURE: CPT | Performed by: INTERNAL MEDICINE

## 2025-02-05 PROCEDURE — 82607 VITAMIN B-12: CPT | Performed by: INTERNAL MEDICINE

## 2025-02-05 PROCEDURE — 83735 ASSAY OF MAGNESIUM: CPT | Performed by: INTERNAL MEDICINE

## 2025-02-12 ENCOUNTER — OFFICE VISIT (OUTPATIENT)
Dept: INTERNAL MEDICINE | Age: 35
End: 2025-02-12
Payer: COMMERCIAL

## 2025-02-12 VITALS
HEART RATE: 91 BPM | OXYGEN SATURATION: 98 % | DIASTOLIC BLOOD PRESSURE: 70 MMHG | BODY MASS INDEX: 33.97 KG/M2 | TEMPERATURE: 97.4 F | WEIGHT: 216.4 LBS | SYSTOLIC BLOOD PRESSURE: 120 MMHG | HEIGHT: 67 IN

## 2025-02-12 DIAGNOSIS — F32.9 DEPRESSION, REACTIVE: ICD-10-CM

## 2025-02-12 DIAGNOSIS — I10 PRIMARY HYPERTENSION: Primary | ICD-10-CM

## 2025-02-12 DIAGNOSIS — R68.82 DECREASED LIBIDO: ICD-10-CM

## 2025-02-12 DIAGNOSIS — J30.1 SEASONAL ALLERGIC RHINITIS DUE TO POLLEN: ICD-10-CM

## 2025-02-12 PROCEDURE — 99213 OFFICE O/P EST LOW 20 MIN: CPT | Performed by: INTERNAL MEDICINE

## 2025-02-12 RX ORDER — BUPROPION HYDROCHLORIDE 150 MG/1
TABLET ORAL
Qty: 180 TABLET | Refills: 1 | Status: SHIPPED | OUTPATIENT
Start: 2025-02-12

## 2025-02-12 RX ORDER — CLOMIPHENE CITRATE 50 MG/1
50 TABLET ORAL DAILY
COMMUNITY

## 2025-02-12 RX ORDER — LISINOPRIL 2.5 MG/1
2.5 TABLET ORAL DAILY
Qty: 90 TABLET | Refills: 1 | Status: SHIPPED | OUTPATIENT
Start: 2025-02-12

## 2025-02-12 NOTE — PATIENT INSTRUCTIONS
Start over-the-counter B12 1000 mcg daily    Continue with bupropion  mg 2 daily and lisinopril 2.5 mg q d    Lisinopril 2.5 mg daily and follow a low-salt diet  Continue to exercise daily    Try to lose 5-10 lbs by next visit

## 2025-02-12 NOTE — PROGRESS NOTES
"CHIEF COMPLAINT  Brian Goodman presents to [facility] for follow-up of Primary Care Follow-Up (2 month follow up.  Follow up on medication bupropion), Labs Only (Were done ), and Hypertension.    HPI  34-year-old patient with hypertension here for 2-month follow-up and review of labs    Records reviewed, meds reviewed in detail, labs reviewed with patient.  Plan of care is as follows below.    Sees  [facility] - now on Clomid    Magnesium 2.3 TFTs normal B12 400s  HDL 37   PMFSH-Reviewed  ROS-no headaches, no HI or SI      Current Outpatient Medications:     buPROPion XL (Wellbutrin XL) 150 MG 24 hr tablet, One tab po q am for one week then 2 tabs po q am, Disp: 180 tablet, Rfl: 1    cetirizine (zyrTEC) 10 MG tablet, Take 1 tablet by mouth Daily. (Patient taking differently: Take 1 tablet by mouth Daily. prn), Disp: 90 tablet, Rfl: 1    lisinopril (Zestril) 2.5 MG tablet, Take 1 tablet by mouth Daily., Disp: 90 tablet, Rfl: 1    montelukast (Singulair) 10 MG tablet, Take 1 tablet by mouth Every Night. (Patient taking differently: Take 1 tablet by mouth Every Night. prn), Disp: 90 tablet, Rfl: 1    clomiPHENE (CLOMID) 50 MG tablet, Take 1 tablet by mouth Daily., Disp: , Rfl:    PFSH reviewed.      OBJECTIVE  Vital Signs  Vitals:    02/12/25 1350   BP: 120/70   BP Location: Left arm   Patient Position: Sitting   Cuff Size: Small Adult   Pulse: 91   Temp: 97.4 °F (36.3 °C)   SpO2: 98%   Weight: 98.2 kg (216 lb 6.4 oz)   Height: 168.9 cm (66.5\")      Body mass index is 34.4 kg/m².    Physical Exam  Vitals and nursing note reviewed.   Constitutional:       Appearance: Normal appearance.   HENT:      Head: Normocephalic and atraumatic.      Nose: Nose normal.   Eyes:      Extraocular Movements: Extraocular movements intact.      Pupils: Pupils are equal, round, and reactive to light.   Cardiovascular:      Rate and Rhythm: Normal rate and regular rhythm.   Pulmonary: " "     Effort: Pulmonary effort is normal.      Breath sounds: Normal breath sounds.   Abdominal:      General: Abdomen is flat.      Palpations: Abdomen is soft.   Musculoskeletal:      Cervical back: Normal range of motion and neck supple.   Skin:     General: Skin is warm and dry.   Neurological:      General: No focal deficit present.      Mental Status: He is alert and oriented to person, place, and time.   Psychiatric:         Mood and Affect: Mood normal.         Behavior: Behavior normal.          RESULTS REVIEW  No results found for: \"PROBNP\", \"BNP\"  CMP          9/9/2024    11:05 9/23/2024    10:26   CMP   Glucose 94  95    BUN 9  11    Creatinine 0.83  0.86    EGFR 119.3  118.0    Sodium 140  139    Potassium 4.6  4.2    Chloride 102  102    Calcium 10.5  9.9    Total Protein 8.2  7.9    Albumin 4.5  4.3    Globulin 3.7  3.6    Total Bilirubin 0.6  1.2    Alkaline Phosphatase 76  80    AST (SGOT) 21  21    ALT (SGPT) 30  24    Albumin/Globulin Ratio 1.2  1.2    BUN/Creatinine Ratio 10.8  12.8    Anion Gap 11.6  11.5      CBC w/diff          9/9/2024    11:05   CBC w/Diff   WBC 5.68    RBC 5.02    Hemoglobin 15.0    Hematocrit 44.6    MCV 88.8    MCH 29.9    MCHC 33.6    RDW 12.2    Platelets 356    Neutrophil Rel % 51.9    Immature Granulocyte Rel % 1.2    Lymphocyte Rel % 36.3    Monocyte Rel % 7.6    Eosinophil Rel % 1.6    Basophil Rel % 1.4       Lipid Panel          9/9/2024    11:05 2/5/2025    08:35   Lipid Panel   Total Cholesterol 162  185    Triglycerides 119  79    HDL Cholesterol 29  37    VLDL Cholesterol 22  15    LDL Cholesterol  111  133    LDL/HDL Ratio 3.77  3.57       Lab Results   Component Value Date    TSH 1.340 02/05/2025    TSH 0.612 09/09/2024      Lab Results   Component Value Date    FREET4 1.24 02/05/2025    FREET4 1.25 09/09/2024         Lab Results   Component Value Date    FUTFARSW74 433 02/05/2025    HKJR20JW 42.5 02/05/2025    MG 2.3 02/05/2025        No Images in the past " 120 days found..             ASSESSMENT & PLAN  Diagnoses and all orders for this visit:    1. Primary hypertension (Primary)  Comments:  BP log [id number]-cont with lsinopril 2.5 mg qd-low salt diet  Assessment & Plan:  HTN-fair control-BP log reviewed     plan-discussed hypertension and complications   goal is blood pressure less than 130/80  Continue with lisinopril 2.5 mg daily  Low salt diet    Orders:  -     lisinopril (Zestril) 2.5 MG tablet; Take 1 tablet by mouth Daily.  Dispense: 90 tablet; Refill: 1    2. Depression, reactive  Assessment & Plan:  Stable -no HI no SI    Cont burpropion  mg 2 q am  Declined BH referral    Orders:  -     lisinopril (Zestril) 2.5 MG tablet; Take 1 tablet by mouth Daily.  Dispense: 90 tablet; Refill: 1  -     buPROPion XL (Wellbutrin XL) 150 MG 24 hr tablet; One tab po q am for one week then 2 tabs po q am  Dispense: 180 tablet; Refill: 1    3. Seasonal allergic rhinitis due to pollen  Comments:  Stable with Zyrtec and montelukast as needed    4. Decreased libido  Assessment & Plan:  Going to [facility]   Now on Clomid           Plan-2/12/2025  Patient Instructions   Start over-the-counter B12 1000 mcg daily    Continue with bupropion  mg 2 daily and lisinopril 2.5 mg q d    Lisinopril 2.5 mg daily and follow a low-salt diet  Continue to exercise daily    Try to lose 5-10 lbs by next visit     FOLLOW UP  Return in about 7 months (around 9/8/2025) for Annual physical, Recheck.    Older Notes  10/29/2024 visit   here for follow-up of blood pressure     Records reviewed, meds reviewed in detail, labs reviewed with patient.  Plan of care is as follows below.     Not walking regularly  Echo 10/2024--no LVH- echo 2009- [facility]- EF50-55%borderline golbal hypokinesis LV  PMFSH-Reviewed  ROS-no headaches, still depressed, decreased motivation     Patient Instructions   Goal weight 170 lbs per pt  Change to wellbutrin  mg one q am x 1 week then 2 po q  am  Walk 30 min daily  Get involved with exercise routine-tennis/join gym      Refer to Dr. Prakash urology for further evaluation of ED      FOLLOW UP  Return in about 2 months (around 2025) for Recheck.     Patient was given instructions and counseling regarding his condition or for health maintenance advice. Please see specific information pulled into the AVS if appropriate.      Older Notes  2024 visit  pt here for Physical and routine f/u and lab review     Records reviewed, meds reviewed in detail, labs reviewed with patient.  Plan of care is as follows below.  , HDL 29,         Palpitations -saw cardio-neg holter-echo as below-  HTN--echo -LVH, LAE enlarged-in past-   FH- Dad  of MI 39 yo, +DM in mom and dad     Instructions-2024  Lisinopril 2.5 mg once a day goal blood pressures less than 130/80  Start Lexapro 10 mg 1 daily  Declined behavioral health at this time will reevaluate  Check testosterone level today  Due to the echo to evaluate LVH and LAE that was seen in  echo  Check blood pressure twice a day 3-4 times a week and record  Walk 30 min daily  Follow-up in 4 weeks     -2024  Patient Instructions   Patient with fever of 17 days etiology unclear we will order lab work to look for fever unknown origin even though technically > 3 weeks is definition of FUO  Check EMILY HIV sed rate EBV urinalysis along with routine labs today  Treat with Z-Amado for broader coverage and use Mucinex twice a day as needed for right serous otitis  Try montelukast/Singulair 10 mg once daily for postnasal drainage  Try Zyrtec 10 mg once daily  Follow-up in 10 to 14 days for reevaluation     24 visit  He was seen at [facility] ER 2024 -had a chest x-ray which was negative for any acute disease-no neck pain at that time negative for strep/flu a flu B- diagnosis was viral illness and low suspicion for any meningitis as he was 8 days out at that time from his  symptoms-temp at that time was 99  Patient initially seen August 30, 2024 at urgent clinic-and had 1 week of upper respiratory infection symptoms with temp up to 102 at that time-temp was 99-diagnosis pharyngitis and was given Keflex 5 mg twice a day for 10 days  -He was negative for COVID flu AB and strep at that time  S/p amoxicillin in 8/2024 for ear infection     Now with NO neck stiffness, temp 101 last night and this am 100.7- took ibuprofen last night-200 mg-2 tabs- and tylenol 500 mg 2x/d past 9-10 days  Records reviewed, meds reviewed in detail, labs reviewed with patient.  Plan of care is as follows below.     [id number]- no meds usually  ROS-fever, congestion, cough, PND, green nasal d/c, no rash, no arthralgias     SH-works at [employer]-from home, has 2 and 6 yo-goes to day care/-not sick at present-wife is a schoolteacher- [facility], no cigs , ETOH or drugs                 Patient was given instructions and counseling regarding his condition or for health maintenance advice. Please see specific information pulled into the AVS if appropriate.

## 2025-02-12 NOTE — ASSESSMENT & PLAN NOTE
HTN-fair control-BP log reviewed     plan-discussed hypertension and complications   goal is blood pressure less than 130/80  Continue with lisinopril 2.5 mg daily  Low salt diet

## 2025-03-18 DIAGNOSIS — J30.1 SEASONAL ALLERGIC RHINITIS DUE TO POLLEN: ICD-10-CM

## 2025-03-18 DIAGNOSIS — H65.01 RIGHT ACUTE SEROUS OTITIS MEDIA, RECURRENCE NOT SPECIFIED: ICD-10-CM

## 2025-03-18 RX ORDER — MONTELUKAST SODIUM 10 MG/1
10 TABLET ORAL
Qty: 90 TABLET | Refills: 1 | Status: SHIPPED | OUTPATIENT
Start: 2025-03-18

## 2025-04-22 ENCOUNTER — LAB (OUTPATIENT)
Facility: HOSPITAL | Age: 35
End: 2025-04-22
Payer: COMMERCIAL

## 2025-04-22 ENCOUNTER — TRANSCRIBE ORDERS (OUTPATIENT)
Dept: ADMINISTRATIVE | Facility: HOSPITAL | Age: 35
End: 2025-04-22
Payer: COMMERCIAL

## 2025-04-22 DIAGNOSIS — E29.1 PRIMARY TESTICULAR FAILURE: Primary | ICD-10-CM

## 2025-04-22 DIAGNOSIS — Z12.5 SCREENING FOR PROSTATE CANCER: ICD-10-CM

## 2025-04-22 DIAGNOSIS — N52.9 ERECTILE DYSFUNCTION, UNSPECIFIED ERECTILE DYSFUNCTION TYPE: ICD-10-CM

## 2025-04-22 DIAGNOSIS — E29.1 PRIMARY TESTICULAR FAILURE: ICD-10-CM

## 2025-04-22 DIAGNOSIS — R68.82 DECREASED LIBIDO: ICD-10-CM

## 2025-04-22 LAB
ALBUMIN SERPL-MCNC: 4.6 G/DL (ref 3.5–5.2)
ALBUMIN/GLOB SERPL: 1.8 G/DL
ALP SERPL-CCNC: 66 U/L (ref 39–117)
ALT SERPL W P-5'-P-CCNC: 23 U/L (ref 1–41)
ANION GAP SERPL CALCULATED.3IONS-SCNC: 10.8 MMOL/L (ref 5–15)
AST SERPL-CCNC: 28 U/L (ref 1–40)
BASOPHILS # BLD AUTO: 0.05 10*3/MM3 (ref 0–0.2)
BASOPHILS NFR BLD AUTO: 1 % (ref 0–1.5)
BILIRUB SERPL-MCNC: 1.4 MG/DL (ref 0–1.2)
BUN SERPL-MCNC: 10 MG/DL (ref 6–20)
BUN/CREAT SERPL: 9.7 (ref 7–25)
CALCIUM SPEC-SCNC: 9.6 MG/DL (ref 8.6–10.5)
CHLORIDE SERPL-SCNC: 106 MMOL/L (ref 98–107)
CO2 SERPL-SCNC: 23.2 MMOL/L (ref 22–29)
CREAT SERPL-MCNC: 1.03 MG/DL (ref 0.76–1.27)
DEPRECATED RDW RBC AUTO: 42 FL (ref 37–54)
EGFRCR SERPLBLD CKD-EPI 2021: 98.4 ML/MIN/1.73
EOSINOPHIL # BLD AUTO: 0.13 10*3/MM3 (ref 0–0.4)
EOSINOPHIL NFR BLD AUTO: 2.6 % (ref 0.3–6.2)
ERYTHROCYTE [DISTWIDTH] IN BLOOD BY AUTOMATED COUNT: 12.8 % (ref 12.3–15.4)
ESTRADIOL SERPL HS-MCNC: 73.9 PG/ML
GLOBULIN UR ELPH-MCNC: 2.6 GM/DL
GLUCOSE SERPL-MCNC: 105 MG/DL (ref 65–99)
HCT VFR BLD AUTO: 45.6 % (ref 37.5–51)
HGB BLD-MCNC: 15.2 G/DL (ref 13–17.7)
IMM GRANULOCYTES # BLD AUTO: 0.01 10*3/MM3 (ref 0–0.05)
IMM GRANULOCYTES NFR BLD AUTO: 0.2 % (ref 0–0.5)
LYMPHOCYTES # BLD AUTO: 1.94 10*3/MM3 (ref 0.7–3.1)
LYMPHOCYTES NFR BLD AUTO: 38.6 % (ref 19.6–45.3)
MCH RBC QN AUTO: 29.9 PG (ref 26.6–33)
MCHC RBC AUTO-ENTMCNC: 33.3 G/DL (ref 31.5–35.7)
MCV RBC AUTO: 89.8 FL (ref 79–97)
MONOCYTES # BLD AUTO: 0.62 10*3/MM3 (ref 0.1–0.9)
MONOCYTES NFR BLD AUTO: 12.3 % (ref 5–12)
NEUTROPHILS NFR BLD AUTO: 2.28 10*3/MM3 (ref 1.7–7)
NEUTROPHILS NFR BLD AUTO: 45.3 % (ref 42.7–76)
NRBC BLD AUTO-RTO: 0 /100 WBC (ref 0–0.2)
PLATELET # BLD AUTO: 202 10*3/MM3 (ref 140–450)
PMV BLD AUTO: 11.3 FL (ref 6–12)
POTASSIUM SERPL-SCNC: 3.9 MMOL/L (ref 3.5–5.2)
PROLACTIN SERPL-MCNC: 9.65 NG/ML (ref 4.04–15.2)
PROT SERPL-MCNC: 7.2 G/DL (ref 6–8.5)
PSA SERPL-MCNC: 1.72 NG/ML (ref 0–4)
RBC # BLD AUTO: 5.08 10*6/MM3 (ref 4.14–5.8)
SODIUM SERPL-SCNC: 140 MMOL/L (ref 136–145)
WBC NRBC COR # BLD AUTO: 5.03 10*3/MM3 (ref 3.4–10.8)

## 2025-04-22 PROCEDURE — 80053 COMPREHEN METABOLIC PANEL: CPT

## 2025-04-22 PROCEDURE — 84402 ASSAY OF FREE TESTOSTERONE: CPT

## 2025-04-22 PROCEDURE — G0103 PSA SCREENING: HCPCS

## 2025-04-22 PROCEDURE — 84146 ASSAY OF PROLACTIN: CPT

## 2025-04-22 PROCEDURE — 36415 COLL VENOUS BLD VENIPUNCTURE: CPT

## 2025-04-22 PROCEDURE — 85025 COMPLETE CBC W/AUTO DIFF WBC: CPT

## 2025-04-22 PROCEDURE — 84403 ASSAY OF TOTAL TESTOSTERONE: CPT

## 2025-04-22 PROCEDURE — 82670 ASSAY OF TOTAL ESTRADIOL: CPT

## 2025-04-25 LAB
TESTOST FREE SERPL-MCNC: 29.4 PG/ML (ref 8.7–25.1)
TESTOST SERPL-MCNC: 1424 NG/DL (ref 264–916)

## 2025-07-22 ENCOUNTER — LAB (OUTPATIENT)
Facility: HOSPITAL | Age: 35
End: 2025-07-22
Payer: COMMERCIAL

## 2025-07-22 ENCOUNTER — TRANSCRIBE ORDERS (OUTPATIENT)
Facility: HOSPITAL | Age: 35
End: 2025-07-22
Payer: COMMERCIAL

## 2025-07-22 DIAGNOSIS — E29.1 FAMILIAL INCOMPLETE MALE PSEUDOHERMAPHRODITISM, TYPE 2: Primary | ICD-10-CM

## 2025-07-22 DIAGNOSIS — E29.1 FAMILIAL INCOMPLETE MALE PSEUDOHERMAPHRODITISM, TYPE 2: ICD-10-CM

## 2025-07-22 LAB
ALBUMIN SERPL-MCNC: 4.3 G/DL (ref 3.5–5.2)
ALBUMIN/GLOB SERPL: 1.5 G/DL
ALP SERPL-CCNC: 55 U/L (ref 39–117)
ALT SERPL W P-5'-P-CCNC: 21 U/L (ref 1–41)
ANION GAP SERPL CALCULATED.3IONS-SCNC: 11.2 MMOL/L (ref 5–15)
AST SERPL-CCNC: 23 U/L (ref 1–40)
BASOPHILS # BLD AUTO: 0.04 10*3/MM3 (ref 0–0.2)
BASOPHILS NFR BLD AUTO: 0.6 % (ref 0–1.5)
BILIRUB SERPL-MCNC: 1.1 MG/DL (ref 0–1.2)
BUN SERPL-MCNC: 11 MG/DL (ref 6–20)
BUN/CREAT SERPL: 10.6 (ref 7–25)
CALCIUM SPEC-SCNC: 9.4 MG/DL (ref 8.6–10.5)
CHLORIDE SERPL-SCNC: 105 MMOL/L (ref 98–107)
CO2 SERPL-SCNC: 23.8 MMOL/L (ref 22–29)
CREAT SERPL-MCNC: 1.04 MG/DL (ref 0.76–1.27)
DEPRECATED RDW RBC AUTO: 41.2 FL (ref 37–54)
EGFRCR SERPLBLD CKD-EPI 2021: 97.2 ML/MIN/1.73
EOSINOPHIL # BLD AUTO: 0.3 10*3/MM3 (ref 0–0.4)
EOSINOPHIL NFR BLD AUTO: 4.6 % (ref 0.3–6.2)
ERYTHROCYTE [DISTWIDTH] IN BLOOD BY AUTOMATED COUNT: 12.5 % (ref 12.3–15.4)
ESTRADIOL SERPL HS-MCNC: 73 PG/ML
GLOBULIN UR ELPH-MCNC: 2.8 GM/DL
GLUCOSE SERPL-MCNC: 90 MG/DL (ref 65–99)
HCT VFR BLD AUTO: 45.6 % (ref 37.5–51)
HGB BLD-MCNC: 15.2 G/DL (ref 13–17.7)
IMM GRANULOCYTES # BLD AUTO: 0.01 10*3/MM3 (ref 0–0.05)
IMM GRANULOCYTES NFR BLD AUTO: 0.2 % (ref 0–0.5)
LYMPHOCYTES # BLD AUTO: 2.71 10*3/MM3 (ref 0.7–3.1)
LYMPHOCYTES NFR BLD AUTO: 41.8 % (ref 19.6–45.3)
MCH RBC QN AUTO: 30.2 PG (ref 26.6–33)
MCHC RBC AUTO-ENTMCNC: 33.3 G/DL (ref 31.5–35.7)
MCV RBC AUTO: 90.5 FL (ref 79–97)
MONOCYTES # BLD AUTO: 0.68 10*3/MM3 (ref 0.1–0.9)
MONOCYTES NFR BLD AUTO: 10.5 % (ref 5–12)
NEUTROPHILS NFR BLD AUTO: 2.75 10*3/MM3 (ref 1.7–7)
NEUTROPHILS NFR BLD AUTO: 42.3 % (ref 42.7–76)
NRBC BLD AUTO-RTO: 0 /100 WBC (ref 0–0.2)
PLATELET # BLD AUTO: 214 10*3/MM3 (ref 140–450)
PMV BLD AUTO: 11.2 FL (ref 6–12)
POTASSIUM SERPL-SCNC: 3.9 MMOL/L (ref 3.5–5.2)
PROT SERPL-MCNC: 7.1 G/DL (ref 6–8.5)
RBC # BLD AUTO: 5.04 10*6/MM3 (ref 4.14–5.8)
SODIUM SERPL-SCNC: 140 MMOL/L (ref 136–145)
WBC NRBC COR # BLD AUTO: 6.49 10*3/MM3 (ref 3.4–10.8)

## 2025-07-22 PROCEDURE — 84402 ASSAY OF FREE TESTOSTERONE: CPT

## 2025-07-22 PROCEDURE — 36415 COLL VENOUS BLD VENIPUNCTURE: CPT

## 2025-07-22 PROCEDURE — 82670 ASSAY OF TOTAL ESTRADIOL: CPT

## 2025-07-22 PROCEDURE — 84403 ASSAY OF TOTAL TESTOSTERONE: CPT

## 2025-07-22 PROCEDURE — 80053 COMPREHEN METABOLIC PANEL: CPT

## 2025-07-22 PROCEDURE — 85025 COMPLETE CBC W/AUTO DIFF WBC: CPT

## 2025-07-25 LAB
TESTOST FREE SERPL-MCNC: 27.8 PG/ML (ref 8.7–25.1)
TESTOST SERPL-MCNC: 1236 NG/DL (ref 264–916)

## 2025-08-26 DIAGNOSIS — F32.9 DEPRESSION, REACTIVE: ICD-10-CM

## 2025-08-26 DIAGNOSIS — I10 PRIMARY HYPERTENSION: ICD-10-CM

## 2025-08-26 RX ORDER — LISINOPRIL 2.5 MG/1
2.5 TABLET ORAL DAILY
Qty: 90 TABLET | Refills: 1 | Status: SHIPPED | OUTPATIENT
Start: 2025-08-26

## 2025-08-26 RX ORDER — BUPROPION HYDROCHLORIDE 150 MG/1
TABLET ORAL
Qty: 180 TABLET | Refills: 1 | Status: SHIPPED | OUTPATIENT
Start: 2025-08-26